# Patient Record
Sex: MALE | Race: WHITE
[De-identification: names, ages, dates, MRNs, and addresses within clinical notes are randomized per-mention and may not be internally consistent; named-entity substitution may affect disease eponyms.]

---

## 2018-02-18 ENCOUNTER — HOSPITAL ENCOUNTER (EMERGENCY)
Dept: HOSPITAL 17 - NEPC | Age: 23
Discharge: HOME | End: 2018-02-18
Payer: COMMERCIAL

## 2018-02-18 VITALS — RESPIRATION RATE: 22 BRPM | HEART RATE: 100 BPM | TEMPERATURE: 97.9 F

## 2018-02-18 VITALS — HEIGHT: 71 IN | BODY MASS INDEX: 24.69 KG/M2 | WEIGHT: 176.37 LBS

## 2018-02-18 DIAGNOSIS — S00.83XA: Primary | ICD-10-CM
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
## 2018-02-18 PROCEDURE — 70450 CT HEAD/BRAIN W/O DYE: CPT

## 2018-02-18 PROCEDURE — 72125 CT NECK SPINE W/O DYE: CPT

## 2018-02-18 PROCEDURE — 99283 EMERGENCY DEPT VISIT LOW MDM: CPT
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
